# Patient Record
Sex: FEMALE | URBAN - METROPOLITAN AREA
[De-identification: names, ages, dates, MRNs, and addresses within clinical notes are randomized per-mention and may not be internally consistent; named-entity substitution may affect disease eponyms.]

---

## 2024-03-27 ENCOUNTER — APPOINTMENT (OUTPATIENT)
Dept: URBAN - METROPOLITAN AREA CLINIC 244 | Age: 54
Setting detail: DERMATOLOGY
End: 2024-03-27

## 2024-03-27 DIAGNOSIS — D22 MELANOCYTIC NEVI: ICD-10-CM

## 2024-03-27 DIAGNOSIS — L82.1 OTHER SEBORRHEIC KERATOSIS: ICD-10-CM

## 2024-03-27 DIAGNOSIS — L81.4 OTHER MELANIN HYPERPIGMENTATION: ICD-10-CM

## 2024-03-27 DIAGNOSIS — L82.0 INFLAMED SEBORRHEIC KERATOSIS: ICD-10-CM

## 2024-03-27 DIAGNOSIS — L57.8 OTHER SKIN CHANGES DUE TO CHRONIC EXPOSURE TO NONIONIZING RADIATION: ICD-10-CM

## 2024-03-27 PROBLEM — D22.9 MELANOCYTIC NEVI, UNSPECIFIED: Status: ACTIVE | Noted: 2024-03-27

## 2024-03-27 PROBLEM — D23.39 OTHER BENIGN NEOPLASM OF SKIN OF OTHER PARTS OF FACE: Status: ACTIVE | Noted: 2024-03-27

## 2024-03-27 PROBLEM — D48.5 NEOPLASM OF UNCERTAIN BEHAVIOR OF SKIN: Status: ACTIVE | Noted: 2024-03-27

## 2024-03-27 PROCEDURE — OTHER PRESCRIPTION: OTHER

## 2024-03-27 PROCEDURE — OTHER BIOPSY BY SHAVE METHOD: OTHER

## 2024-03-27 PROCEDURE — 11102 TANGNTL BX SKIN SINGLE LES: CPT

## 2024-03-27 PROCEDURE — 99203 OFFICE O/P NEW LOW 30 MIN: CPT | Mod: 25

## 2024-03-27 PROCEDURE — OTHER PRESCRIPTION MEDICATION MANAGEMENT: OTHER

## 2024-03-27 PROCEDURE — OTHER COUNSELING: OTHER

## 2024-03-27 PROCEDURE — OTHER DEFER: OTHER

## 2024-03-27 RX ORDER — TRETIONIN 0.25 MG/G
CREAM TOPICAL
Qty: 45 | Refills: 5 | Status: ERX | COMMUNITY
Start: 2024-03-27

## 2024-03-27 ASSESSMENT — LOCATION SIMPLE DESCRIPTION DERM
LOCATION SIMPLE: RIGHT CHEEK
LOCATION SIMPLE: LEFT BREAST
LOCATION SIMPLE: LEFT CHEEK

## 2024-03-27 ASSESSMENT — LOCATION ZONE DERM
LOCATION ZONE: FACE
LOCATION ZONE: TRUNK

## 2024-03-27 ASSESSMENT — LOCATION DETAILED DESCRIPTION DERM
LOCATION DETAILED: LEFT MEDIAL BREAST 10-11:00 REGION
LOCATION DETAILED: LEFT INFERIOR CENTRAL MALAR CHEEK
LOCATION DETAILED: RIGHT INFERIOR CENTRAL MALAR CHEEK

## 2024-03-27 NOTE — PROCEDURE: DEFER
Introduction Text (Please End With A Colon): The following procedure was deferred:
Procedure To Be Performed At Next Visit: Shave Removal (Cosmetic)
Instructions (Optional): 20 minute cosmetic shave removal (fees were discussed with patient during visit) + 10 minutes for prep
Size Of Lesion In Cm (Optional): 0
Detail Level: Detailed

## 2024-03-27 NOTE — PROCEDURE: BIOPSY BY SHAVE METHOD
Detail Level: Simple
Was A Bandage Applied: Yes
Electrodesiccation And Curettage Text: The wound bed was treated with electrodesiccation and curettage after the biopsy was performed.
Information: Selecting Yes will display possible errors in your note based on the variables you have selected. This validation is only offered as a suggestion for you. PLEASE NOTE THAT THE VALIDATION TEXT WILL BE REMOVED WHEN YOU FINALIZE YOUR NOTE. IF YOU WANT TO FAX A PRELIMINARY NOTE YOU WILL NEED TO TOGGLE THIS TO 'NO' IF YOU DO NOT WANT IT IN YOUR FAXED NOTE.
Consent: Written consent was obtained and risks were reviewed including but not limited to scarring, infection, bleeding, scabbing, incomplete removal, nerve damage and allergy to anesthesia.
Silver Nitrate Text: The wound bed was treated with silver nitrate after the biopsy was performed.
Wound Care: Petrolatum
Hide Biopsy Depth?: No
Additional Anesthesia Volume In Cc (Will Not Render If 0): 0
Anesthesia Type: 0.5% lidocaine with 1:100,000 epinephrine and a 1:10 solution of 8.4% sodium bicarbonate
Dressing: bandage
Biopsy Method: double edge Personna blade
Anesthesia Volume In Cc: 0.5
Post-Care Instructions: I reviewed with the patient in detail post-care instructions. Patient is to keep the biopsy site dry overnight, and then apply bacitracin twice daily until healed. Patient may apply hydrogen peroxide soaks to remove any crusting.
Lab: -[id number]
Type Of Destruction Used: Curettage
Curettage Text: The wound bed was treated with curettage after the biopsy was performed.
Billing Type: Third-Party Bill
Hemostasis: Drysol
Notification Instructions: Patient will be notified of biopsy results. However, patient instructed to call the office if not contacted within 2 weeks.
Cryotherapy Text: The wound bed was treated with cryotherapy after the biopsy was performed.
Biopsy Type: H and E
Electrodesiccation Text: The wound bed was treated with electrodesiccation after the biopsy was performed.
Depth Of Biopsy: dermis

## 2025-01-24 ENCOUNTER — TELEPHONE (OUTPATIENT)
Dept: UROLOGY | Facility: HOSPITAL | Age: 55
End: 2025-01-24

## 2025-01-24 NOTE — TELEPHONE ENCOUNTER
Outgoing telephone call to patient using hospital provided  service.   Spoke with Kisha @ [phone].  Kisha left message on voicemail in [place] stating the following:  Attempting to complete new patient intake form.   Encouraged to contact office to complete the above.   Office number provided.

## 2025-01-28 ENCOUNTER — OFFICE VISIT (OUTPATIENT)
Dept: UROLOGY | Facility: HOSPITAL | Age: 55
End: 2025-01-28
Attending: OBSTETRICS & GYNECOLOGY
Payer: COMMERCIAL

## 2025-01-28 VITALS — WEIGHT: 144 LBS | BODY MASS INDEX: 24.59 KG/M2 | HEIGHT: 64 IN | RESPIRATION RATE: 18 BRPM

## 2025-01-28 DIAGNOSIS — N94.10 DYSPAREUNIA IN FEMALE: ICD-10-CM

## 2025-01-28 DIAGNOSIS — N39.3 STRESS INCONTINENCE: ICD-10-CM

## 2025-01-28 DIAGNOSIS — K59.00 CONSTIPATION, UNSPECIFIED CONSTIPATION TYPE: ICD-10-CM

## 2025-01-28 DIAGNOSIS — N95.2 VAGINAL ATROPHY: ICD-10-CM

## 2025-01-28 DIAGNOSIS — R10.2 PELVIC PAIN: Primary | ICD-10-CM

## 2025-01-28 DIAGNOSIS — Z87.448 HISTORY OF HEMATURIA: ICD-10-CM

## 2025-01-28 LAB
BILIRUB UR QL: NEGATIVE
CLARITY UR: CLEAR
COLOR UR: YELLOW
CONTROL RUN WITHIN 24 HOURS?: YES
GLUCOSE UR-MCNC: NORMAL MG/DL
KETONES UR-MCNC: NEGATIVE MG/DL
LEUKOCYTE ESTERASE UR QL STRIP.AUTO: NEGATIVE
NITRITE UR QL STRIP.AUTO: NEGATIVE
NITRITE URINE: NEGATIVE
PH UR: 7 [PH] (ref 5–8)
SP GR UR STRIP: 1.02 (ref 1–1.03)
UROBILINOGEN UR STRIP-ACNC: NORMAL

## 2025-01-28 PROCEDURE — 81002 URINALYSIS NONAUTO W/O SCOPE: CPT | Performed by: OBSTETRICS & GYNECOLOGY

## 2025-01-28 PROCEDURE — 99202 OFFICE O/P NEW SF 15 MIN: CPT

## 2025-01-28 PROCEDURE — 51798 US URINE CAPACITY MEASURE: CPT

## 2025-01-28 PROCEDURE — 88108 CYTOPATH CONCENTRATE TECH: CPT | Performed by: OBSTETRICS & GYNECOLOGY

## 2025-01-28 PROCEDURE — 81001 URINALYSIS AUTO W/SCOPE: CPT | Performed by: OBSTETRICS & GYNECOLOGY

## 2025-01-28 PROCEDURE — 87086 URINE CULTURE/COLONY COUNT: CPT | Performed by: OBSTETRICS & GYNECOLOGY

## 2025-01-28 RX ORDER — PLECANATIDE 3 MG/1
1 TABLET ORAL DAILY
COMMUNITY
Start: 2024-10-15

## 2025-01-28 RX ORDER — DOLUTEGRAVIR SODIUM AND RILPIVIRINE HYDROCHLORIDE 50; 25 MG/1; MG/1
1 TABLET, FILM COATED ORAL DAILY
COMMUNITY

## 2025-01-28 RX ORDER — ASPIRIN 325 MG
325 TABLET, DELAYED RELEASE (ENTERIC COATED) ORAL DAILY
COMMUNITY
Start: 2023-02-08

## 2025-01-28 RX ORDER — METFORMIN HYDROCHLORIDE 500 MG/1
1000 TABLET, EXTENDED RELEASE ORAL DAILY
COMMUNITY
Start: 2024-12-24

## 2025-01-28 RX ORDER — ZOLPIDEM TARTRATE 10 MG/1
10 TABLET ORAL NIGHTLY
COMMUNITY
Start: 2023-03-03

## 2025-01-28 RX ORDER — ESTRADIOL 0.1 MG/G
CREAM VAGINAL
Qty: 42.5 G | Refills: 3 | Status: SHIPPED | OUTPATIENT
Start: 2025-01-28

## 2025-01-28 NOTE — PROGRESS NOTES
ID: Palma Kumar  : 1970  Date: 2025     Referred by Dr. Shailesh MD    Chief Complaint   Patient presents with    Pelvic Pain    Incontinence     CHRIS    Other     Microhematuria       HPI:  54 year old Kazakh speaking female, G1,  Vaginal deliveries 0, who presents for evaluation of LLQ x 6 months.   Has been dealing with constipation, currently on Trulance and Miralax prn with improvement.  Also reports CHRIS longstanding with laughing, coughing, sneezing.   Voids every 2 hours during the day and x 2 at night.  No UUI  No prolapse  Has a uterus.  LMP at age 42.  Not on hormones. No PMB.  Denies history of UTIs.  No urines on file. No history of gross hematuria.   Was told she had \"blood in urine\" while being evaluated for her pain.  Had renal US on 24, showing left simple renal cyst.  Had pelvic US on 24 with no abnormalities.  + dyspareunia since menopause. Used to be on Vagifem but discontinued due to loss of follow up.  Here with her .    PMHx: + HIV, h/o brain aneurysms, h/o endometriosis, prediabetes (HbA1C 5.9).         Urogynecology Summary:  Urogynecology Summary  Prolapse: No  CHRIS: Yes (Reports with laugh, sneeze, cough sometimes)  Urge Incontinence: No  Nocturia Frequency: 2  Frequency: 2 hours  Incomplete emptying: Yes (Reports sense of incomplete emptying at times)  Constipation: Yes (Takes daily Trulance x 2 months, Miralax PRN, discussed bowel regimen in detail, Italian handout given)  Wears pad day?: 2  Wears Pad Night?: 0  Activities are limited by UI/POP?: No  Currently Sexually Active: Yes  Avoids sexual activity due to: Pain (+coital incontinence)          HISTORY:  No past medical history on file.   No past surgical history on file.   No family history on file.   Social History     Socioeconomic History    Marital status:      Social Drivers of Health     Transportation Needs: Unknown (2019)    Received from [facility] -  Transportation     Lack of Transportation (Medical): Patient declined     Lack of Transportation (Non-Medical): Patient declined   Physical Activity: Unknown (5/14/2019)    Received from [facility]    Exercise Vital Sign     Days of Exercise per Week: Patient declined     Minutes of Exercise per Session: Patient declined    [facility]        Allergies:  Allergies[1]    Medications:  Medications Prior to Visit[2]    Review of Systems:    A comprehensive 12 point review of systems was completed.  Pertinent positives noted in the the HPI.  Denies CP  Denies SOB    Vitals:  Resp 18   Ht 64\"   Wt 144 lb (65.3 kg)   BMI 24.72 kg/m²        GENERAL EXAM:  GENERAL:  Alert and oriented. Well-nourished, normally developed.  Thought and emotional status are appropriate, speech is understandable.  No acute distress.   HEAD: Normocephalic and atraumatic with normal hair distribution  LUNGS:  Normal respiratory effort.    ABDOMEN: Non tender to palpation, tone normal without rigidity or guarding, no masses present, no evidence of hernia.   EXTREMITIES:  Without edema, varicosities or lesions.   SKIN:  Warm and dry, with good color and turgor. No lesions.    PELVIC EXAM:  External Genitalia: Normal appearance for age. + atrophy, no lesions  Urethra: + atrophy, non tender  Bladder:no fullness, non tender  Vagina: + atrophy, no lesions   Cervix: no bleeding, no lesions, non tender  Uterus: soft, mobile, non tender  Adnexa:no masses, non tender  Perineum: non tender  Anus: no hemorrhoids  Rectum: deferred.     PELVIS FLOOR NEUROMUSCULAR FUNCTION:  Strength:  2  Perineal Sensation:  Normal      PELVIC SUPPORT:  [place]:  0  Ant:  0  Post:  0  CST:  negative  UVJ: + hypermobile    The patient voided 50 ml in the privacy of the bathroom. This was collected as a clean catch urine specimen and sent for UA, micro, C&S, cytology.    Procedure bladder scan -[id number]:   The indication for this procedure  is reflected in the history of present illness section.  The patient was placed in the supine position.  Ultrasound transmission gel was applied to the bladder scan machine probe. The probe was placed 1 cm above the pubic bone in proper orientation and aimed toward the bladder. The probe was positioned so that the bladder was in the cross hairs of the aiming screen.  The largest volume was recorded.  Scanned volume:  17 mL.       Impression/Plan:    ICD-10-CM    1. Pelvic pain  R10.2 PHYSICAL THERAPY - External Location      2. Constipation, unspecified constipation type  K59.00 PHYSICAL THERAPY - External Location      3. History of hematuria  Z87.448 Urinalysis, Routine     Urine Culture, Routine     Bladder Wash (Urine for Cytology)     POCT urinalysis dipstick[[id number]]      4. Vaginal atrophy  N95.2 estradiol (ESTRACE) 0.1 MG/GM Vaginal Cream      5. Dyspareunia in female  N94.10 PHYSICAL THERAPY - External Location      6. Stress incontinence  N39.3 Urinalysis, Routine     Urine Culture, Routine     PHYSICAL THERAPY - External Location     POCT urinalysis dipstick[[id number]]          Discussion Items:   Nonsurgical and surgical treatments for Stress Urinary Incontinence  Pelvic muscle rehabilitation including pelvic floor PT  Topical estrogen therapy for treating UGA  Bowel routine/constipation regimen  Discussed dietary and behavioral modification, discussed pharmacologic and nonpharmacologic mgmt options for urinary symptoms. Discussed dietary & weight management with potential improvements in symptoms with weight loss.    Diagnostic Items:  UA, micro, C&S, cytology  Cystoscopy    Medications Discussed:  Estrace vaginal cream 1 gram per vagina x 3 per week.     Treatment Plan, Non-surgical:   Pelvic floor PT. Referral provided.     Treatment Plan, Surgical:   None. May need sling in the future if still bothered by CHRIS.    Pt and  verbalizes understanding of all above discussed information. Follow up for  cystoscopy.     Stacey Cancino MD, FACOG, FACS  Female Pelvic Medicine and  Reconstructive Surgery (Urogynecology)           [1] No Known Allergies  [2]   Outpatient Medications Prior to Visit   Medication Sig Dispense Refill    aspirin 325 MG Oral Tab EC Take 1 tablet (325 mg total) by mouth daily.      metFORMIN  MG Oral Tablet 24 Hr Take 2 tablets (1,000 mg total) by mouth daily.      TRULANCE 3 MG Oral Tab Take 1 tablet by mouth daily.      zolpidem 10 MG Oral Tab Take 1 tablet (10 mg total) by mouth nightly.      JULUCA 50-25 MG Oral Tab Take 1 tablet by mouth daily.       No facility-administered medications prior to visit.

## 2025-01-29 LAB — NON GYNE INTERPRETATION: NEGATIVE

## 2025-01-30 ENCOUNTER — TELEPHONE (OUTPATIENT)
Dept: UROLOGY | Facility: HOSPITAL | Age: 55
End: 2025-01-30

## 2025-01-30 NOTE — TELEPHONE ENCOUNTER
TC to pt using  [employer] #[id number].  No answer.  Left detailed message informing pt that her urine showed microscopic hematuria, urine culture showed no infection, and urine cytology showed no abnormalities.  Return for cysto 3/4/25 as scheduled.  Encouraged to call our office back with any questions or concerns.

## 2025-02-21 ENCOUNTER — TELEPHONE (OUTPATIENT)
Dept: UROLOGY | Facility: HOSPITAL | Age: 55
End: 2025-02-21

## 2025-02-21 NOTE — TELEPHONE ENCOUNTER
Outgoing telephone call to patient @ [phone] . No answer.  Left message on voicemail stating the following:  You will need to contact our office, to reschedule your 04/24 telephone f/u with Mahnaz Thapa. Unfortunately, she   Will be unavailable. Office number provided.

## 2025-03-04 ENCOUNTER — TELEPHONE (OUTPATIENT)
Dept: UROLOGY | Facility: HOSPITAL | Age: 55
End: 2025-03-04

## 2025-03-18 ENCOUNTER — OFFICE VISIT (OUTPATIENT)
Dept: UROLOGY | Facility: HOSPITAL | Age: 55
End: 2025-03-18
Attending: OBSTETRICS & GYNECOLOGY
Payer: COMMERCIAL

## 2025-03-18 VITALS — RESPIRATION RATE: 16 BRPM | BODY MASS INDEX: 24.59 KG/M2 | WEIGHT: 144 LBS | HEIGHT: 64 IN

## 2025-03-18 DIAGNOSIS — R31.29 MICROSCOPIC HEMATURIA: Primary | ICD-10-CM

## 2025-03-18 DIAGNOSIS — N39.3 STRESS INCONTINENCE: ICD-10-CM

## 2025-03-18 DIAGNOSIS — N94.10 DYSPAREUNIA IN FEMALE: ICD-10-CM

## 2025-03-18 DIAGNOSIS — R35.1 NOCTURIA: ICD-10-CM

## 2025-03-18 DIAGNOSIS — N95.2 VAGINAL ATROPHY: ICD-10-CM

## 2025-03-18 LAB
CONTROL RUN WITHIN 24 HOURS?: YES
LEUKOCYTE ESTERASE URINE: NEGATIVE
NITRITE URINE: NEGATIVE

## 2025-03-18 PROCEDURE — 81002 URINALYSIS NONAUTO W/O SCOPE: CPT | Performed by: OBSTETRICS & GYNECOLOGY

## 2025-03-18 PROCEDURE — 99212 OFFICE O/P EST SF 10 MIN: CPT

## 2025-03-18 PROCEDURE — 81002 URINALYSIS NONAUTO W/O SCOPE: CPT

## 2025-03-18 PROCEDURE — 52000 CYSTOURETHROSCOPY: CPT

## 2025-03-18 RX ORDER — LIDOCAINE HYDROCHLORIDE 20 MG/ML
10 JELLY TOPICAL ONCE
Status: COMPLETED | OUTPATIENT
Start: 2025-03-18 | End: 2025-03-18

## 2025-03-18 RX ORDER — SOLIFENACIN SUCCINATE 5 MG/1
5 TABLET, FILM COATED ORAL DAILY
Qty: 30 TABLET | Refills: 3 | Status: SHIPPED | OUTPATIENT
Start: 2025-03-18

## 2025-03-18 RX ADMIN — LIDOCAINE HYDROCHLORIDE 10 ML: 20 JELLY TOPICAL at 11:00:00

## 2025-03-18 NOTE — PROGRESS NOTES
ID: Palma Kumar  : 1970  Date: 3/18/25    Chief Complaint   Patient presents with    Cystourethroscopy Procedure     For eval of micro hematuria       HPI:  54 year old Vietnamese speaking female, G1,  Vaginal deliveries 0, who was originally seen on 25. She presented for evaluation of LLQ x 6 months.   Has been dealing with constipation, on Trulance and Miralax prn with improvement.  Also reports CHRIS longstanding with laughing, coughing, sneezing.   Voids every 2 hours during the day and x 2 at night.  No UUI  No prolapse  Has a uterus.  LMP at age 42.  Not on hormones. No PMB.  Denies history of UTIs.  No urines on file. No history of gross hematuria.   Was told she had \"blood in urine\" while being evaluated for her pain.  Had renal US on 24, showing left simple renal cyst.  Had pelvic US on 24 with no abnormalities.  + dyspareunia since menopause. Used to be on Vagifem but discontinued due to loss of follow up.  Here with her .    PMHx: + HIV, h/o brain aneurysms, h/o endometriosis, prediabetes (HbA1C 5.9).     Initial urogyn exam demonstrated + UGA, no prolapse, no retention. UA + 3-5 RBCS, urine culture and cytology negative. Recommended to start Estrace vaginal cream x 3 per week. Referred to pelvic floor PT. Returns for cystoscopy.     Interval history:  Unable to do pelvic floor PT due to cost. May be able to do later in the year if she meets deductible.   Has been using Estrace vaginal cream x 3 per week. Some improvement in dyspareunia.   No UTI symptoms.  Continues to void every 2 hours during the day and x 2 at night.  Denies UUI  Some CHRIS.  Bowels are constipated.  Uses daily fiber.       Urogynecology Summary:  Urogynecology Summary  Prolapse: No  CHRIS: Yes  Urge Incontinence: No  Nocturia Frequency: 2  Frequency: 2 hours  Incomplete emptying: No  Constipation: Yes  Wears pad day?: 2  Wears Pad Night?: 0  Activities are limited by UI/POP?: No  Currently Sexually  Active: Yes      Review of Systems:    A comprehensive 12 point review of systems was completed.  Pertinent positives noted in the the HPI.  Denies CP  Denies SOB    Vitals:  Resp 16   Ht 64\"   Wt 144 lb (65.3 kg)   BMI 24.72 kg/m²      General:  Alert and oriented. Well-nourished, normally developed.  Thought and emotional status are appropriate, speech is understandable.  No acute distress.  Pelvic: deferred.     Procedure Note:  After obtaining appropriated consent, procedural pause was performed. The patient was properly positioned and the urethra was cleansed with antiseptic. 2% lidocaine was placed in the urethra for local analgesia.  An Endosee catheter was inserted and the urethra and bladder were inspected with the following findings:  Urethra: Normal, no lesions  Urethrovesical junction: Normal.  Bladder: The bladder was filled by gravity to capacity.  The trigone was normal. The ureteral orifices were normally located and demonstrated normal ejection of urine.  There were moderate bladder trabeculations, otherwise no urothelial lesions, stones, cysts or tumor growth.        Impression:    ICD-10-CM    1. Microscopic hematuria  R31.29 POCT urinalysis dipstick[[id number]]     lidocaine (Urojet) 2 % urethral jelly 10 mL     CANCELED: Bladder Wash (Urine for Cytology)      2. Vaginal atrophy  N95.2       3. Dyspareunia in female  N94.10       4. Stress incontinence  N39.3       5. Nocturia  R35.1             Plan:  Reassured regarding cystoscopic findings.   Recommend to continue Estrace vaginal cream x 3 per week.  Discussed treatment options for CHRIS/UUI.   Agrees to trial of OAB meds with Solifenacin 5 mg PO daily.  Discussed MUS for CHRIS symptoms. Would like to avoid for now. If symptoms persist, will need UDS.   Follow up in 3 months .      Stacey Cancino MD, FACOG, FACS  Female Pelvic Medicine and  Reconstructive Surgery (Urogynecology)

## 2025-06-25 NOTE — TELEPHONE ENCOUNTER
TC to pt using  [facility] #[id number] regarding refill request for vesicare.  No answer.  Left detailed message informing her that she was to f/u after 3 months, but nothing scheduled.  Encouraged pt to call back to schedule f/u and to discuss further.

## 2025-07-02 RX ORDER — SOLIFENACIN SUCCINATE 5 MG/1
5 TABLET, FILM COATED ORAL DAILY
Qty: 30 TABLET | Refills: 3 | OUTPATIENT
Start: 2025-07-02

## 2025-07-02 NOTE — TELEPHONE ENCOUNTER
Second attempt calling pt using  [employer] #[id number].  No answer.  Left detailed message encouraging pt to schedule a follow up appt prior to refilling rx.  Will decline refill at this time.

## 2025-08-19 ENCOUNTER — OFFICE VISIT (OUTPATIENT)
Dept: UROLOGY | Facility: HOSPITAL | Age: 55
End: 2025-08-19
Attending: OBSTETRICS & GYNECOLOGY

## 2025-08-19 VITALS — HEIGHT: 64 IN | WEIGHT: 144 LBS | BODY MASS INDEX: 24.59 KG/M2 | RESPIRATION RATE: 16 BRPM

## 2025-08-19 DIAGNOSIS — N94.10 DYSPAREUNIA IN FEMALE: ICD-10-CM

## 2025-08-19 DIAGNOSIS — N39.3 STRESS INCONTINENCE: Primary | ICD-10-CM

## 2025-08-19 PROCEDURE — 99212 OFFICE O/P EST SF 10 MIN: CPT

## (undated) NOTE — LETTER
[facility] - [facility] UROGYNECOLOGY  [address]  [place] [address]  PH: [phone]  FAX: [phone]   Consent to Procedure/Sedation    Date: __3/18/2025_____    Time: ___9:58 AM ___    1. I authorize the performance upon Palma Kumar the following:  {Urogyne Procedures for Consent:6394}      2. I authorize {[facility] PROVIDERS:[id number]} (and whomever is designated as the doctor’s assistant), to perform the above mentioned procedures.    3. If any unforeseen conditions arise during this procedure calling for additional procedures, operations, or medications (including anesthesia and blood transfusion), I  further request and authorize the doctor to do whatever he/she deems advisable in my interest.    4. I consent to the taking and reproduction of any photographs in the course of this procedure for professional purposes.    5. I consent to the administration of such sedation as may be considered necessary or advisable by the physician responsible for this service, with the exception of  _____________________________.    6. I have been informed by my doctor of the nature and purpose of this procedure/sedation, possible alternative methods of treatment, risk involved and possible complications.    7. If I have a Do Not Resuscitate (DNR) order in place, the physician and I (or the  individual authorized to consent on my behalf) will discuss and agree as to whether the  Do Not Resuscitate (DNR) order will remain in effect or will be discontinued during the  performance of the procedure and the applicable recovery period. If the Do Not  Resuscitate (DNR) order is discontinued and is to be reinstated following the  procedure/recovery period, the physician will determine when the applicable recovery  period ends for purposes of reinstating the Do Not Resuscitate (DNR) order.    8. In the event your procedure results in extended X-Ray/Fluoroscopy time, you may develop a  skin reaction.    Signature of Patient:  ___________________________    Signature of person authorized to consent for patient: Relationship to patient:  ___________________________    ___________________    Witness: ____________________     Date: ______________    Printed: 3/18/2025   9:58 AM    Patient Name: Palma Kumar        : 1970       Medical Record #: [id number]

## (undated) NOTE — LETTER
[facility] - [facility] UROGYNECOLOGY  [address]  [place] [address]  PH: [phone]  FAX: [phone]   Consent to Procedure/Sedation    Date: __3/18/2025_____    Time: ___9:57 AM ___    1. I authorize the performance upon Palma Kumar the following:  Cystourethroscopy      2. I authorize Dr. Stacey Cancino (and whomever is designated as the doctor’s assistant), to perform the above mentioned procedures.    3. If any unforeseen conditions arise during this procedure calling for additional procedures, operations, or medications (including anesthesia and blood transfusion), I  further request and authorize the doctor to do whatever he/she deems advisable in my interest.    4. I consent to the taking and reproduction of any photographs in the course of this procedure for professional purposes.    5. I consent to the administration of such sedation as may be considered necessary or advisable by the physician responsible for this service, with the exception of  _____________________________.    6. I have been informed by my doctor of the nature and purpose of this procedure/sedation, possible alternative methods of treatment, risk involved and possible complications.    7. If I have a Do Not Resuscitate (DNR) order in place, the physician and I (or the  individual authorized to consent on my behalf) will discuss and agree as to whether the  Do Not Resuscitate (DNR) order will remain in effect or will be discontinued during the  performance of the procedure and the applicable recovery period. If the Do Not  Resuscitate (DNR) order is discontinued and is to be reinstated following the  procedure/recovery period, the physician will determine when the applicable recovery  period ends for purposes of reinstating the Do Not Resuscitate (DNR) order.    8. In the event your procedure results in extended X-Ray/Fluoroscopy time, you may develop a skin reaction.    Signature  of Patient:  ___________________________    Signature of person authorized to consent for patient: Relationship to patient:  ___________________________    ___________________    Witness: ____________________     Date: ______________    Printed: 3/18/2025   9:57 AM    Patient Name: Palma Kumar        : 1970       Medical Record #: [id number]